# Patient Record
Sex: FEMALE | Race: WHITE
[De-identification: names, ages, dates, MRNs, and addresses within clinical notes are randomized per-mention and may not be internally consistent; named-entity substitution may affect disease eponyms.]

---

## 2017-05-15 ENCOUNTER — HOSPITAL ENCOUNTER (EMERGENCY)
Dept: HOSPITAL 49 - FER | Age: 57
Discharge: HOME | End: 2017-05-15
Payer: MEDICARE

## 2017-05-15 DIAGNOSIS — Z88.2: ICD-10-CM

## 2017-05-15 DIAGNOSIS — M62.830: Primary | ICD-10-CM

## 2017-05-15 DIAGNOSIS — E03.9: ICD-10-CM

## 2017-05-15 DIAGNOSIS — I10: ICD-10-CM

## 2017-05-15 DIAGNOSIS — E11.9: ICD-10-CM

## 2017-05-15 DIAGNOSIS — Z79.899: ICD-10-CM

## 2017-05-15 DIAGNOSIS — Z79.84: ICD-10-CM

## 2017-05-15 LAB
BILIRUBIN: NEGATIVE MG/DL
BLOOD: NEGATIVE ERY/UL
CLARITY UR: CLEAR
COLOR: YELLOW
GLUCOSE (U): (no result) MG/DL
KETONE (U): NEGATIVE MG/DL
LEUKOCYTES: NEGATIVE LEU/UL
NITRITE: NEGATIVE MG/DL
PROTEIN: NEGATIVE MG/DL
SPECIFIC GRAVITY: 1.02 (ref 1–1.03)
UROBILINOGEN: 0.2 MG/DL (ref 0.2–1)

## 2020-05-13 ENCOUNTER — TELEMEDICINE CONVERTED (OUTPATIENT)
Dept: NEUROLOGY | Facility: CLINIC | Age: 60
End: 2020-05-13
Attending: NURSE PRACTITIONER

## 2020-06-11 ENCOUNTER — OFFICE VISIT CONVERTED (OUTPATIENT)
Dept: NEUROLOGY | Facility: CLINIC | Age: 60
End: 2020-06-11
Attending: NURSE PRACTITIONER

## 2020-07-23 ENCOUNTER — OFFICE VISIT CONVERTED (OUTPATIENT)
Dept: NEUROLOGY | Facility: CLINIC | Age: 60
End: 2020-07-23
Attending: NURSE PRACTITIONER

## 2020-07-27 ENCOUNTER — HOSPITAL ENCOUNTER (OUTPATIENT)
Dept: PHYSICAL THERAPY | Facility: CLINIC | Age: 60
Setting detail: RECURRING SERIES
Discharge: HOME | End: 2020-09-16
Attending: NURSE PRACTITIONER

## 2020-09-22 ENCOUNTER — OFFICE VISIT CONVERTED (OUTPATIENT)
Dept: NEUROLOGY | Facility: CLINIC | Age: 60
End: 2020-09-22
Attending: NURSE PRACTITIONER

## 2020-10-28 ENCOUNTER — OFFICE VISIT CONVERTED (OUTPATIENT)
Dept: NEUROLOGY | Facility: CLINIC | Age: 60
End: 2020-10-28
Attending: NURSE PRACTITIONER

## 2021-01-18 ENCOUNTER — OFFICE VISIT CONVERTED (OUTPATIENT)
Dept: NEUROLOGY | Facility: CLINIC | Age: 61
End: 2021-01-18
Attending: NURSE PRACTITIONER

## 2021-05-10 NOTE — H&P
History and Physical      Patient Name: Zunilda Singh   Patient ID: [id number]   Sex: Female   YOB: 1960        Visit Date: May 13, 2020    Provider: JULISA Cooper   Location: [facility]   Location Address: [address] [place] [address]  [id number]   Location Phone: [phone]          Chief Complaint  · Concussion      History Of Present Illness  Video Conferencing Visit  Zunilda Singh is a 59 year old /White female who is presenting for evaluation via video conferencing. Verbal consent obtained before beginning visit.   The following staff were present during this visit: Sugey Huerta CMA and JULISA Cooper      Had head injury on April 28, 2020.  Was at the [employer] and there were 2 displays stacked on top of each other.  States the displays weren't attached and when she bent down to get an item on the bottom the top fell over on her head.  Had glassware on it.  Knocked her bridge out of her mouth, has gotten that replaced. Tried to get the managers attention but couldn't get their attention.  Started to have a severe headache.  Denies loss of consciousness.  States she got really sleepy.  Went home and slept for a couple hours.  Experienced nausea.  4 days later, went to the ER.  Was having persistent headaches.  Had CT scan that was negative.  States she has experienced fatigue, occasional nausea, daily headache in varying degrees of severity, insomnia, dizziness/vertigo, short term memory loss, blurred vision.       Past Medical History  Diabetes mellitus, type II; Fibromyalgia; Hypertension, essential; Hypothyroidism, Acquired         Past Surgical History  *Denies any surgical procedures         Medication List  diclofenac sodium 75 mg oral tablet,delayed release (DR/EC); Diflucan 150 mg oral tablet; levothyroxine 125 mcg oral tablet; lisinopril 10 mg oral tablet; metformin 500 mg oral tablet; verapamil 240 mg oral tablet  extended release         Allergy List  SULFA (SULFONAMIDES)         Family Medical History  *Non Contributory         Social History  Tobacco (Never)         Review of Systems  · Constitutional  o Denies  o : chills, excessive sweating, fatigue, fever, sycope/passing out, weight gain, weight loss  · Eyes  o Admits  o : blurry vision  o Denies  o : changes in vision, double vision  · HENT  o Denies  o : loss of hearing, ringing in the ears, ear aches, sore throat, nasal congestion, sinus pain, nose bleeds, seasonal allergies  · Cardiovascular  o Denies  o : blood clots, swollen legs, anemia, easy burising or bleeding, transfusions  · Respiratory  o Denies  o : shortness of breath, dry cough, productive cough, pneumonia, COPD  · Gastrointestinal  o Denies  o : difficulty swallowing, reflux  · Genitourinary  o Denies  o : incontinence  · Neurologic  o Admits  o : headache  o Denies  o : seizure, stroke, tremor, loss of balance, falls, dizziness/vertigo, difficulty with sleep, numbness/tingling/paresthesia , difficulty with coordination, difficulty with dexterity, weakness  · Musculoskeletal  o Denies  o : neck stiffness/pain, swollen lymph nodes, muscle aches, joint pain, weakness, spasms, sciatica, pain radiating in arm, pain radiating in leg, low back pain  · Endocrine  o Denies  o : diabetes, thyroid disorder  · Psychiatric  o Denies  o : anxiety, depression      Physical Examination     The patient is alert and oriented to person, place, and time with normal speech. No motor deficits are noted. Sensation is intact bilaterally. Cranial nerves are intact, as tested via video technology. Cerebellar function is intact. Memory is normal and thought process is intact. No gait abnormalities are appreciated.           Assessment  · Post concussive syndrome     310.2/F07.81  Will start CoQ10 for preventative therapy of post-concussive headache. Will give PRN meclizine for vertigo. PRN Zofran for nausea. PRN Melatonin for  insomnia. Discussed that time is imperative for improvement of post-concussive syndrome. Will reassess in 1 month. If no improvement will consider MRI brain. CT head was negative. Symptoms do seem to be slowly improving per the patient.     Problems Reconciled  Plan  · Medications  o Co Q-10 100 mg oral capsule   SIG: take 3 capsules by oral route daily for 30 days   DISP: (90) capsules with 3 refills  Prescribed on 05/13/2020     o meclizine 25 mg oral tablet   SIG: take 1 tablet (25 mg) by oral route once daily as needed for 30 days   DISP: (30) tablets with 3 refills  Prescribed on 05/13/2020     o melatonin 5 mg oral capsule   SIG: take 1 capsule by oral route HS for 30 days   DISP: (30) capsules with 3 refills  Prescribed on 05/13/2020     o Zofran 8 mg oral tablet   SIG: take 1 tablet (8 mg) by oral route every 8 hours PRN nausea   DISP: (90) tablets with 2 refills  Prescribed on 05/13/2020     o Medications have been Reconciled  o Transition of Care or Provider Policy  · Instructions  o F/U 1 month in [place]  · Disposition  o Call or Return if symptoms worsen or persist.            Electronically Signed by: JULISA Cooper -Author on May 15, 2020 10:42:38 AM

## 2021-05-13 NOTE — PROGRESS NOTES
Progress Note      Patient Name: Zunilda Singh   Patient ID: [id number]   Sex: Female   YOB: 1960    Primary Care Provider: Irene EGAN   Referring Provider: Irene EGAN    Visit Date: June 11, 2020    Provider: JULISA Cooper   Location: [facility]   Location Address: [address] [place] [address] [address] [place], [place]  [id number]   Location Phone: [phone]          Chief Complaint  · Follow Up Exam      History Of Present Illness  Zunilda Singh is a 59 year old /White female who presents today to [facility] today for a follow up exam for concussion. Headaches have improved. Still has some pressure to the head. Nausea has decreased but still comes and goes. Continues to have some tinnitus and blurred vision. Continues to have some irritability. Continues to have memory loss.        From Initial Note:   Had head injury on April 28, 2020.  Was at the [employer] and there were 2 displays stacked on top of each other.  States the displays weren't attached and when she bent down to get an item on the bottom the top fell over on her head.  Had glassware on it.  Knocked her bridge out of her mouth, has gotten that replaced. Tried to get the managers attention but couldn't get their attention.  Started to have a severe headache.  Denies loss of consciousness.  States she got really sleepy.  Went home and slept for a couple hours.  Experienced nausea.  4 days later, went to the ER.  Was having persistent headaches.  Had CT scan that was negative.  States she has experienced fatigue, occasional nausea, daily headache in varying degrees of severity, insomnia, dizziness/vertigo, short term memory loss, blurred vision.       Past Medical History  Diabetes mellitus, type II; Fibromyalgia; Hypertension, essential; Hypothyroidism, Acquired         Past Surgical History  *Denies any surgical procedures         Medication List  Co Q-10 100 mg  "oral capsule; diclofenac sodium 75 mg oral tablet,delayed release (DR/EC); Diflucan 150 mg oral tablet; levothyroxine 125 mcg oral tablet; lisinopril 10 mg oral tablet; meclizine 25 mg oral tablet; melatonin 5 mg oral capsule; metformin 500 mg oral tablet; verapamil 240 mg oral tablet extended release; Zofran 8 mg oral tablet         Allergy List  SULFA (SULFONAMIDES)         Family Medical History  *Non Contributory         Social History  Tobacco (Never)         Review of Systems  · Constitutional  o Denies  o : chills, excessive sweating, fatigue, fever, sycope/passing out, weight gain, weight loss  · Eyes  o Admits  o : blurry vision  o Denies  o : changes in vision, double vision  · HENT  o Admits  o : ringing in the ears  o Denies  o : loss of hearing, ear aches, sore throat, nasal congestion, sinus pain, nose bleeds, seasonal allergies  · Cardiovascular  o Denies  o : blood clots, swollen legs, anemia, easy burising or bleeding, transfusions  · Respiratory  o Denies  o : shortness of breath, dry cough, productive cough, pneumonia, COPD  · Gastrointestinal  o Denies  o : difficulty swallowing, reflux  · Genitourinary  o Denies  o : incontinence  · Neurologic  o Admits  o : headache  o Denies  o : seizure, stroke, tremor, loss of balance, falls, dizziness/vertigo, difficulty with sleep, numbness/tingling/paresthesia , difficulty with coordination, difficulty with dexterity, weakness  · Musculoskeletal  o Denies  o : neck stiffness/pain, swollen lymph nodes, muscle aches, joint pain, weakness, spasms, sciatica, pain radiating in arm, pain radiating in leg, low back pain  · Endocrine  o Denies  o : diabetes, thyroid disorder  · Psychiatric  o Denies  o : anxiety, depression      Vitals  Date Time BP Position Site L\R Cuff Size HR RR TEMP (F) WT  HT  BMI kg/m2 BSA m2 O2 Sat HC       06/11/2020 03:40 /65 Sitting    93 - R  97.1 195lbs 4oz 5'  6\" 31.51 2.03           Physical " Examination  · Constitutional  o Appearance  o : well-nourished, well groomed, in no apparent distress  · Neurologic  o Mental Status Examination  o :   § Orientation  § : Alert and oriented to person, place, and time.   § Speech/Language  § : intact naming, comprehension, and repetition. No dysarthria.  § Fund of Knowledge  § : Adequate fund of knowledge.  o Cranial Nerves  o : Pupils are equal, round and reactive to light. Extraocular movements are intact. Visual fields are full. Fundoscopic examination reveals sharp disc bilaterally. Sensation in the V1-V3 distribution is intact and symmetric. Muscles of mastication are strong and symmetric. Muscles of facial expression are strong and symmetric. Hearing is intact. Palatal raise is intact and symmetric. Uvula is midline. Shoulder shrug is strong. Tongue protrudes in the midline.  o Motor Examination  o :   § RUE Strength  § : strength normal  § LUE Strength  § : strength normal  § RLE Strength  § : strength normal  § LLE Strength  § : strength normal  o Reflexes  o : 2+ reflexes throughout and symmetric. Negative Juarez. Negative Babinski.   o Sensation  o : Intact sensation to light touch, pinprick, vibration and proprioception throughout  o Gait and Station  o : The patient is noted to have a normal, narrow based gait with normal arm swing  o Cerebellar Function  o : intact finger to nose and heel to shin. Rapid alternating movements are intact in the upper and lower extremities.           Assessment  · Post concussive syndrome     310.2/F07.81  Will continue CoQ10, meclizine, Zofran for supportive care. Will start low dose Zoloft for irritability. Discussed that concussions can take time to resolve. Will continue to monitor.   · Memory deficit     780.93/R41.3    Problems Reconciled  Plan  · Orders  o SPEECH THERAPY CONSULTATION (SPECH) - 310.2/F07.81, 780.93/R41.3 - 06/11/2020  · Medications  o Zoloft 25 mg oral tablet   SIG: take 1 tablet (25 mg) by oral  route once daily for 30 days   DISP: (30) tablets with 3 refills  Prescribed on 06/11/2020     o Transition of Care or Provider Policy  · Instructions  o Call or Return if symptoms worsen or persist.   o Follow up in 2 months.            Electronically Signed by: JULISA Cooper -Author on June 14, 2020 10:11:50 AM

## 2021-05-13 NOTE — PROGRESS NOTES
Progress Note      Patient Name: Zunilda Singh   Patient ID: [id number]   Sex: Female   YOB: 1960    Primary Care Provider: Irene EGAN   Referring Provider: Irene EGAN    Visit Date: October 28, 2020    Provider: JULISA Cooper   Location: [facility]   Location Address: [address] [place] [address]  [address] [place] [address]  [id number]   Location Phone: [phone]          Chief Complaint  · Follow Up Exam      History Of Present Illness  Zunilda Singh is a 60 year old /White female who presents today to [facility] today for a follow up exam. States she's feeling much better. Concussion symptoms have improved. Insomnia is resolved. Irritability is resolved. Headache is resolved. Doing a lot better.       Past Medical History  Diabetes Mellitus, Type II; Fibromyalgia; Hypertension, essential; Hypothyroidism, Acquired         Past Surgical History  *Denies any surgical procedures         Medication List  diclofenac sodium 75 mg oral tablet,delayed release (DR/EC); levothyroxine 125 mcg oral tablet; lisinopril 10 mg oral tablet; metformin 500 mg oral tablet; verapamil 240 mg oral tablet extended release         Allergy List  SULFA (SULFONAMIDES)         Family Medical History  *Non Contributory         Social History  Tobacco (Never)         Review of Systems  · Constitutional  o Denies  o : chills, excessive sweating, fatigue, fever, sycope/passing out, weight gain, weight loss  · Eyes  o Denies  o : changes in vision, blurry vision, double vision  · HENT  o Denies  o : loss of hearing, ringing in the ears, ear aches, sore throat, nasal congestion, sinus pain, nose bleeds, seasonal allergies  · Cardiovascular  o Denies  o : blood clots, swollen legs, anemia, easy burising or bleeding, transfusions  · Respiratory  o Denies  o : shortness of breath, dry cough, productive cough, pneumonia, COPD  · Gastrointestinal  o Denies  o : difficulty swallowing,  "reflux  · Genitourinary  o Denies  o : incontinence  · Neurologic  o Denies  o : headache, seizure, stroke, tremor, loss of balance, falls, dizziness/vertigo, difficulty with sleep, numbness/tingling/paresthesia , difficulty with coordination, difficulty with dexterity, weakness  · Musculoskeletal  o Denies  o : neck stiffness/pain, swollen lymph nodes, muscle aches, joint pain, weakness, spasms, sciatica, pain radiating in arm, pain radiating in leg, low back pain  · Endocrine  o Denies  o : diabetes, thyroid disorder  · Psychiatric  o Denies  o : anxiety, depression      Vitals  Date Time BP Position Site L\R Cuff Size HR RR TEMP (F) WT  HT  BMI kg/m2 BSA m2 O2 Sat FR L/min FiO2 HC       10/28/2020 02:28 /71 Sitting    96 - R  96.7 184lbs 0oz 5'  6\" 29.7 1.97             Physical Examination  · Constitutional  o Appearance  o : well-nourished, well groomed, in no apparent distress  · Neurologic  o Mental Status Examination  o :   § Orientation  § : Alert and oriented to person, place, and time.   § Speech/Language  § : intact naming, comprehension, and repetition. No dysarthria.  § Fund of Knowledge  § : Adequate fund of knowledge.  o Cranial Nerves  o : Pupils are equal, round and reactive to light. Extraocular movements are intact. Visual fields are full. Fundoscopic examination reveals sharp disc bilaterally. Sensation in the V1-V3 distribution is intact and symmetric. Muscles of mastication are strong and symmetric. Muscles of facial expression are strong and symmetric. Hearing is intact. Palatal raise is intact and symmetric. Uvula is midline. Shoulder shrug is strong. Tongue protrudes in the midline.  o Motor Examination  o :   § RUE Strength  § : strength normal  § LUE Strength  § : strength normal  § RLE Strength  § : strength normal  § LLE Strength  § : strength normal  o Reflexes  o : 2+ reflexes throughout and symmetric. Negative Juarez. Negative Babinski.   o Sensation  o : Intact sensation to " light touch, pinprick, vibration and proprioception throughout  o Gait and Station  o : The patient is noted to have a normal, narrow based gait with normal arm swing  o Cerebellar Function  o : intact finger to nose and heel to shin. Rapid alternating movements are intact in the upper and lower extremities.           Assessment  · Postconcussive syndrome     310.2/F07.81  Doing well, back to baseline. Will f/u with neurology PRN if needed. Has discontinued all medications.      Plan  · Medications  o Medications have been Reconciled  o Transition of Care or Provider Policy  · Instructions  o Encouraged to follow-up with Primary Care Provider for preventative care.  o Call or Return if symptoms worsen or persist.   o Follow up PRN.  · Disposition  o Call or Return if symptoms worsen or persist.            Electronically Signed by: JULISA Cooper -Author on October 29, 2020 09:13:26 AM

## 2021-05-13 NOTE — PROGRESS NOTES
Progress Note      Patient Name: Zunilda Singh   Patient ID: [id number]   Sex: Female   YOB: 1960    Primary Care Provider: Irene EGAN   Referring Provider: Irene EGAN    Visit Date: September 22, 2020    Provider: JULISA Cooper   Location: [facility]   Location Address: [address] [place] [address] [place], [place]  [id number]   Location Phone: [phone]          Chief Complaint     Patient is here for a 2 month follow up. States the pressure behind her eyes has improved.       History Of Present Illness  Zunilda Singh is a 59 year old /White female who presents today to [facility] today for a follow up exam for concussion. States she completed speech therapy for memory loss successfully. Feels like that helped a lot and memory has improved. States the retro-obtial pressure is improving. Continuing to have mild headache daily. Stopped the CoQ10. Is taking Tylenol daily. Sleep has improved some. Irritability has improved. Reviewed ST note.        From Initial Note:   Had head injury on April 28, 2020.  Was at the [employer] and there were 2 displays stacked on top of each other.  States the displays weren't attached and when she bent down to get an item on the bottom the top fell over on her head.  Had glassware on it.  Knocked her bridge out of her mouth, has gotten that replaced. Tried to get the managers attention but couldn't get their attention.  Started to have a severe headache.  Denies loss of consciousness.  States she got really sleepy.  Went home and slept for a couple hours.  Experienced nausea.  4 days later, went to the ER.  Was having persistent headaches.  Had CT scan that was negative.  States she has experienced fatigue, occasional nausea, daily headache in varying degrees of severity, insomnia, dizziness/vertigo, short term memory loss, blurred vision.       Past Medical History  Diabetes Mellitus,  Type II; Fibromyalgia; Hypertension, essential; Hypothyroidism, Acquired         Past Surgical History  *Denies any surgical procedures         Medication List  diclofenac sodium 75 mg oral tablet,delayed release (DR/EC); levothyroxine 125 mcg oral tablet; lisinopril 10 mg oral tablet; melatonin 5 mg oral capsule; metformin 500 mg oral tablet; verapamil 240 mg oral tablet extended release         Allergy List  SULFA (SULFONAMIDES)       Allergies Reconciled  Family Medical History  *Non Contributory         Social History  Tobacco (Never)         Review of Systems  · Constitutional  o Admits  o : fatigue  o Denies  o : chills, excessive sweating, fever, sycope/passing out, weight gain  · Eyes  o Admits  o : blurry vision  o Denies  o : changes in vision  · HENT  o Admits  o : ringing in the ears  o Denies  o : loss of hearing, sore throat, nasal congestion, sinus pain, nose bleeds, seasonal allergies  · Cardiovascular  o Denies  o : blood clots, swollen legs, anemia, easy burising or bleeding, transfusions  · Respiratory  o Denies  o : shortness of breath, dry cough, productive cough, pneumonia, COPD  · Gastrointestinal  o Denies  o : difficulty swallowing, reflux  · Genitourinary  o Denies  o : incontinence  · Neurologic  o Admits  o : headache, loss of balance, dizziness/vertigo, difficulty with sleep, weakness  o Denies  o : seizure, stroke, tremor, falls, numbness/tingling/paresthesia , difficulty with coordination, difficulty with dexterity  · Musculoskeletal  o Admits  o : neck stiffness/pain, muscle aches, weakness  o Denies  o : swollen lymph nodes, joint pain, spasms, sciatica, pain radiating in arm, pain radiating in leg, low back pain  · Endocrine  o Admits  o : diabetes, thyroid disorder  · Psychiatric  o Admits  o : anxiety, depression      Vitals  Date Time BP Position Site L\R Cuff Size HR RR TEMP (F) WT  HT  BMI kg/m2 BSA m2 O2 Sat HC       09/22/2020 08:59 /80 Sitting    86 - R  96.1 185lbs  "16oz 5'  6\" 30.02 1.98           Physical Examination  · Constitutional  o Appearance  o : well-nourished, well groomed, in no apparent distress  · Neurologic  o Mental Status Examination  o :   § Orientation  § : Alert and oriented to person, place, and time.   § Speech/Language  § : intact naming, comprehension, and repetition. No dysarthria.  § Fund of Knowledge  § : Adequate fund of knowledge.  o Cranial Nerves  o : Pupils are equal, round and reactive to light. Extraocular movements are intact. Visual fields are full. Fundoscopic examination reveals sharp disc bilaterally. Sensation in the V1-V3 distribution is intact and symmetric. Muscles of mastication are strong and symmetric. Muscles of facial expression are strong and symmetric. Hearing is intact. Palatal raise is intact and symmetric. Uvula is midline. Shoulder shrug is strong. Tongue protrudes in the midline.  o Motor Examination  o :   § RUE Strength  § : strength normal  § LUE Strength  § : strength normal  § RLE Strength  § : strength normal  § LLE Strength  § : strength normal  o Reflexes  o : 2+ reflexes throughout and symmetric. Negative Juarez. Negative Babinski.   o Sensation  o : Intact sensation to light touch, pinprick, vibration and proprioception throughout  o Gait and Station  o : The patient is noted to have a normal, narrow based gait with normal arm swing  o Cerebellar Function  o : intact finger to nose and heel to shin. Rapid alternating movements are intact in the upper and lower extremities.           Assessment  · Post concussive syndrome     310.2/F07.81  Discussed preventative therapy of headache with CoQ10, she will restart this. Discussed importance of limiting OTCs due to medication overuse headache. D/C meclizine, Zoloft, and Zofran. Has had some significant improvement in post-concussive syndrome. Discussed that concussions can take time to resolve. Will continue to monitor.   · Memory deficit     780.93/R41.3  Did well " with cognitive therapy. Continue exercises at home.     Problems Reconciled  Plan  · Medications  o CoQ-10 100 mg oral capsule   SIG: take 3 capsules by oral route daily for 30 days   DISP: (90) capsules with 2 refills  Prescribed on 09/22/2020     o Medications have been Reconciled  o Transition of Care or Provider Policy  · Instructions  o F/U 3 months  · Disposition  o Call or Return if symptoms worsen or persist.            Electronically Signed by: JULISA Cooper -Author on September 22, 2020 09:16:07 AM

## 2021-05-13 NOTE — PROGRESS NOTES
Progress Note      Patient Name: Zunilda Singh   Patient ID: [id number]   Sex: Female   YOB: 1960    Primary Care Provider: Irene EGAN   Referring Provider: Irene EGAN    Visit Date: July 23, 2020    Provider: JULISA Cooper   Location: [facility]   Location Address: [address] [place] [address]  [address] [place] [address]  [id number]   Location Phone: [phone]          Chief Complaint     follow up       History Of Present Illness  Zunilda Singh is a 59 year old /White female who presents today to [facility] today for a follow up exam for concussion. Headaches have improved. Still has some pressure to the head. Nausea has decreased but still comes and goes. Continues to have some tinnitus and blurred vision. Continues to have some irritability. Continues to have memory loss. Has not started speech therapy. MRI brain read normal.        From Initial Note:   Had head injury on April 28, 2020.  Was at the [employer] and there were 2 displays stacked on top of each other.  States the displays weren't attached and when she bent down to get an item on the bottom the top fell over on her head.  Had glassware on it.  Knocked her bridge out of her mouth, has gotten that replaced. Tried to get the managers attention but couldn't get their attention.  Started to have a severe headache.  Denies loss of consciousness.  States she got really sleepy.  Went home and slept for a couple hours.  Experienced nausea.  4 days later, went to the ER.  Was having persistent headaches.  Had CT scan that was negative.  States she has experienced fatigue, occasional nausea, daily headache in varying degrees of severity, insomnia, dizziness/vertigo, short term memory loss, blurred vision.       Past Medical History  Diabetes Mellitus, Type II; Fibromyalgia; Hypertension, essential; Hypothyroidism, Acquired         Past Surgical History  *Denies any surgical procedures          Medication List  Co Q-10 100 mg oral capsule; diclofenac sodium 75 mg oral tablet,delayed release (DR/EC); Diflucan 150 mg oral tablet; levothyroxine 125 mcg oral tablet; lisinopril 10 mg oral tablet; meclizine 25 mg oral tablet; melatonin 5 mg oral capsule; metformin 500 mg oral tablet; verapamil 240 mg oral tablet extended release; Zofran 8 mg oral tablet; Zoloft 25 mg oral tablet         Allergy List  SULFA (SULFONAMIDES)         Family Medical History  *Non Contributory         Social History  Tobacco (Never)         Review of Systems  · Constitutional  o Admits  o : fatigue, weight loss  o Denies  o : chills, excessive sweating, fever, sycope/passing out, weight gain  · Eyes  o Admits  o : blurry vision, double vision  o Denies  o : changes in vision  · HENT  o Admits  o : ringing in the ears, ear aches  o Denies  o : loss of hearing, sore throat, nasal congestion, sinus pain, nose bleeds, seasonal allergies  · Cardiovascular  o Denies  o : blood clots, swollen legs, anemia, easy burising or bleeding, transfusions  · Respiratory  o Denies  o : shortness of breath, dry cough, productive cough, pneumonia, COPD  · Gastrointestinal  o Denies  o : difficulty swallowing, reflux  · Genitourinary  o Denies  o : incontinence  · Neurologic  o Admits  o : headache, loss of balance, dizziness/vertigo, weakness  o Denies  o : seizure, stroke, tremor, falls, difficulty with sleep, numbness/tingling/paresthesia , difficulty with coordination, difficulty with dexterity  · Musculoskeletal  o Admits  o : neck stiffness/pain, muscle aches, weakness  o Denies  o : swollen lymph nodes, joint pain, spasms, sciatica, pain radiating in arm, pain radiating in leg, low back pain  · Endocrine  o Admits  o : diabetes, thyroid disorder  · Psychiatric  o Admits  o : anxiety, depression      Vitals  Date Time BP Position Site L\R Cuff Size HR RR TEMP (F) WT  HT  BMI kg/m2 BSA m2 O2 Sat        07/23/2020 09:41 /93 Sitting     "82 - R  97.3 192lbs 7oz 5'  6\" 31.06 2.02           Physical Examination  · Constitutional  o Appearance  o : well-nourished, well groomed, in no apparent distress  · Neurologic  o Mental Status Examination  o :   § Orientation  § : Alert and oriented to person, place, and time.   § Speech/Language  § : intact naming, comprehension, and repetition. No dysarthria.  § Fund of Knowledge  § : Adequate fund of knowledge.  o Cranial Nerves  o : Pupils are equal, round and reactive to light. Extraocular movements are intact. Visual fields are full. Fundoscopic examination reveals sharp disc bilaterally. Sensation in the V1-V3 distribution is intact and symmetric. Muscles of mastication are strong and symmetric. Muscles of facial expression are strong and symmetric. Hearing is intact. Palatal raise is intact and symmetric. Uvula is midline. Shoulder shrug is strong. Tongue protrudes in the midline.  o Motor Examination  o :   § RUE Strength  § : strength normal  § LUE Strength  § : strength normal  § RLE Strength  § : strength normal  § LLE Strength  § : strength normal  o Reflexes  o : 2+ reflexes throughout and symmetric. Negative Juarez. Negative Babinski.   o Sensation  o : Intact sensation to light touch, pinprick, vibration and proprioception throughout  o Gait and Station  o : The patient is noted to have a normal, narrow based gait with normal arm swing  o Cerebellar Function  o : intact finger to nose and heel to shin. Rapid alternating movements are intact in the upper and lower extremities.           Assessment  · Post concussive syndrome     310.2/F07.81  Discussed MRI brain. Was read normal. Will continue CoQ10, meclizine, Zofran for supportive care. Will continue low dose Zoloft for irritability. Discussed that concussions can take time to resolve. Will continue to monitor.   · Memory deficit     780.93/R41.3  Continue with therapy referral.     Problems Reconciled  Plan  · Medications  o Medications have " been Reconciled  o Transition of Care or Provider Policy  · Instructions  o f/u 2 months  · Disposition  o Call or Return if symptoms worsen or persist.            Electronically Signed by: JULISA Cooper -Author on July 24, 2020 01:05:37 PM

## 2021-05-14 VITALS
SYSTOLIC BLOOD PRESSURE: 115 MMHG | HEIGHT: 66 IN | HEART RATE: 86 BPM | WEIGHT: 186 LBS | DIASTOLIC BLOOD PRESSURE: 80 MMHG | TEMPERATURE: 96.1 F | BODY MASS INDEX: 29.89 KG/M2

## 2021-05-14 VITALS
TEMPERATURE: 96.7 F | HEART RATE: 96 BPM | WEIGHT: 184 LBS | SYSTOLIC BLOOD PRESSURE: 125 MMHG | HEIGHT: 66 IN | DIASTOLIC BLOOD PRESSURE: 71 MMHG | BODY MASS INDEX: 29.57 KG/M2

## 2021-05-14 VITALS
TEMPERATURE: 95.9 F | HEART RATE: 94 BPM | SYSTOLIC BLOOD PRESSURE: 166 MMHG | HEIGHT: 66 IN | BODY MASS INDEX: 28.93 KG/M2 | WEIGHT: 180 LBS | DIASTOLIC BLOOD PRESSURE: 91 MMHG

## 2021-05-14 NOTE — PROGRESS NOTES
Progress Note      Patient Name: Zunilda Singh   Patient ID: [id number]   Sex: Female   YOB: 1960    Primary Care Provider: Irene EGAN   Referring Provider: Irene EGAN    Visit Date: January 18, 2021    Provider: JULISA Cooper   Location: [facility]   Location Address: [address] [place] [address]  [address] [place] [address]  [id number]   Location Phone: [phone]          History Of Present Illness  Zunilda Singh is a 60 year old /White female who presents today to [facility] today for a follow up exam. States that she was given flexeril a while back and headaches had resolved. But since she has ran out of flexeril headache have returned. States headaches are daily and present in a band like presentation around the head. Endorses retro-orbital pressure.       Past Medical History  Diabetes Mellitus, Type II; Fibromyalgia; Hypertension, essential; Hypothyroidism, Acquired; Postconcussive syndrome         Past Surgical History  *Denies any surgical procedures         Medication List  cyclobenzaprine 10 mg oral tablet; diclofenac sodium 75 mg oral tablet,delayed release (DR/EC); levothyroxine 125 mcg oral tablet; lisinopril 10 mg oral tablet; metformin 500 mg oral tablet; verapamil 240 mg oral tablet extended release         Allergy List  SULFA (SULFONAMIDES)       Allergies Reconciled  Family Medical History  *Non Contributory         Social History  Tobacco (Never)         Review of Systems  · Constitutional  o Denies  o : chills, excessive sweating, fatigue, fever, sycope/passing out, weight gain, weight loss  · Eyes  o Denies  o : changes in vision, blurry vision, double vision  · HENT  o Denies  o : loss of hearing, ringing in the ears, ear aches, sore throat, nasal congestion, sinus pain, nose bleeds, seasonal allergies  · Cardiovascular  o Denies  o : blood clots, swollen legs, anemia, easy burising or bleeding,  "transfusions  · Respiratory  o Denies  o : shortness of breath, dry cough, productive cough, pneumonia, COPD  · Gastrointestinal  o Denies  o : difficulty swallowing, reflux  · Genitourinary  o Denies  o : incontinence  · Neurologic  o Denies  o : headache, seizure, stroke, tremor, loss of balance, falls, dizziness/vertigo, difficulty with sleep, numbness/tingling/paresthesia , difficulty with coordination, difficulty with dexterity, weakness  · Musculoskeletal  o Denies  o : neck stiffness/pain, swollen lymph nodes, muscle aches, joint pain, weakness, spasms, sciatica, pain radiating in arm, pain radiating in leg, low back pain  · Endocrine  o Denies  o : diabetes, thyroid disorder  · Psychiatric  o Denies  o : anxiety, depression      Vitals  Date Time BP Position Site L\R Cuff Size HR RR TEMP (F) WT  HT  BMI kg/m2 BSA m2 O2 Sat FR L/min FiO2 HC       01/18/2021 01:42 /91 Sitting    94 - R  95.9 180lbs 0oz 5'  6\" 29.05 1.95             Physical Examination  · Constitutional  o Appearance  o : well-nourished, well groomed, in no apparent distress  · Neurologic  o Mental Status Examination  o :   § Orientation  § : Alert and oriented to person, place, and time.   § Speech/Language  § : intact naming, comprehension, and repetition. No dysarthria.  § Fund of Knowledge  § : Adequate fund of knowledge.  o Cranial Nerves  o : Pupils are equal, round and reactive to light. Extraocular movements are intact. Visual fields are full. Fundoscopic examination reveals sharp disc bilaterally. Sensation in the V1-V3 distribution is intact and symmetric. Muscles of mastication are strong and symmetric. Muscles of facial expression are strong and symmetric. Hearing is intact. Palatal raise is intact and symmetric. Uvula is midline. Shoulder shrug is strong. Tongue protrudes in the midline.  o Motor Examination  o :   § RUE Strength  § : strength normal  § LUE Strength  § : strength normal  § RLE Strength  § : strength " normal  § LLE Strength  § : strength normal  o Reflexes  o : 2+ reflexes throughout and symmetric. Negative Juarez. Negative Babinski.   o Sensation  o : Intact sensation to light touch, pinprick, vibration and proprioception throughout  o Gait and Station  o : The patient is noted to have a normal, narrow based gait with normal arm swing  o Cerebellar Function  o : intact finger to nose and heel to shin. Rapid alternating movements are intact in the upper and lower extremities.           Assessment  · Tension type headache     339.10/G44.209  Flexeril qHS for tension type headache. If no improvement, can consider other preventative options in the future.       Plan  · Medications  o cyclobenzaprine 10 mg oral tablet   SIG: take 1 tablet (10 mg) by oral route once daily for 30 days   DISP: (30) Tablet with 1 refills  Prescribed on 01/18/2021     o Medications have been Reconciled  o Transition of Care or Provider Policy  · Disposition  o Call or Return if symptoms worsen or persist.            Electronically Signed by: JULISA Cooper -Author on January 20, 2021 11:11:45 AM

## 2021-05-15 VITALS
HEART RATE: 82 BPM | DIASTOLIC BLOOD PRESSURE: 93 MMHG | BODY MASS INDEX: 30.93 KG/M2 | HEIGHT: 66 IN | TEMPERATURE: 97.3 F | WEIGHT: 192.44 LBS | SYSTOLIC BLOOD PRESSURE: 108 MMHG

## 2021-05-15 VITALS
SYSTOLIC BLOOD PRESSURE: 115 MMHG | WEIGHT: 195.25 LBS | DIASTOLIC BLOOD PRESSURE: 65 MMHG | HEART RATE: 93 BPM | BODY MASS INDEX: 31.38 KG/M2 | HEIGHT: 66 IN | TEMPERATURE: 97.1 F